# Patient Record
Sex: FEMALE | URBAN - METROPOLITAN AREA
[De-identification: names, ages, dates, MRNs, and addresses within clinical notes are randomized per-mention and may not be internally consistent; named-entity substitution may affect disease eponyms.]

---
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
## 2024-04-08 ENCOUNTER — OFFICE VISIT (OUTPATIENT)
Dept: URGENT CARE | Facility: CLINIC | Age: 19
End: 2024-04-08
Payer: COMMERCIAL

## 2024-04-08 VITALS
DIASTOLIC BLOOD PRESSURE: 82 MMHG | RESPIRATION RATE: 20 BRPM | OXYGEN SATURATION: 98 % | TEMPERATURE: 101.1 F | HEART RATE: 128 BPM | WEIGHT: 115.2 LBS | SYSTOLIC BLOOD PRESSURE: 108 MMHG

## 2024-04-08 DIAGNOSIS — B34.9 ACUTE VIRAL SYNDROME: Primary | ICD-10-CM

## 2024-04-08 DIAGNOSIS — R68.89 FLU-LIKE SYMPTOMS: ICD-10-CM

## 2024-04-08 LAB — S PYO AG THROAT QL: NEGATIVE

## 2024-04-08 PROCEDURE — 87070 CULTURE OTHR SPECIMN AEROBIC: CPT | Performed by: PHYSICIAN ASSISTANT

## 2024-04-08 PROCEDURE — 99203 OFFICE O/P NEW LOW 30 MIN: CPT | Performed by: PHYSICIAN ASSISTANT

## 2024-04-08 PROCEDURE — 87147 CULTURE TYPE IMMUNOLOGIC: CPT | Performed by: PHYSICIAN ASSISTANT

## 2024-04-08 PROCEDURE — 87636 SARSCOV2 & INF A&B AMP PRB: CPT | Performed by: PHYSICIAN ASSISTANT

## 2024-04-08 NOTE — PATIENT INSTRUCTIONS
Upper Respiratory Infection     Over-the-counter medications to help with symptoms   Plain Robitussin or plain Mucinex as directed on the packaging for mucus relief  Sudafed (for those without history of high blood pressure) or Coricidin HBP (for those with history of high blood pressure) for nasal/sinus congestion  Ibuprofen or Acetaminophen for pain, fever, or sore throat   Afrin nasal spray (do not use more than 5 days!)   Saline nasal spray or Flonase nasal spray for nasal congestion  Vitamin C supplements may help shorten duration of colds  Other measures to help with illness   Get plenty of rest   Increase your fluid intake while you feel ill (especially drinks with electrolytes such as Gatorade or Pedialyte)  Follow up with your primary care provider if:  You develop a fever after being fever-free (>100 degrees F)  You have mild chest tightness or mild shortness of breath   Symptoms worsen after initially getting better   Symptoms persist more than 10 days   Go to the emergency room if:   You have moderate to severe chest tightness or shortness of breath   You have any other severe or concerning symptoms

## 2024-04-08 NOTE — PROGRESS NOTES
[facility]        NAME: Madelyn Ferrell is a 18 y.o. female  : 2005    MRN: [id number]  DATE: 2024  TIME: 6:38 PM      Assessment and Plan     Acute viral syndrome [B34.9]  1. Acute viral syndrome  POCT rapid strepA      2. Flu-like symptoms  Covid/Flu-Office Collect    Throat culture    CANCELED: Throat culture        Note:   Rapid strep negative in office   Will send out throat culture and treat if necessary based on results   Sending out covid/flu PCR test - won't change treatment at this time   Patient to continue OTC medications as needed for symptoms     Patient Instructions     Patient Instructions   Upper Respiratory Infection     Over-the-counter medications to help with symptoms   Plain Robitussin or plain Mucinex as directed on the packaging for mucus relief  Sudafed (for those without history of high blood pressure) or Coricidin HBP (for those with history of high blood pressure) for nasal/sinus congestion  Ibuprofen or Acetaminophen for pain, fever, or sore throat   Afrin nasal spray (do not use more than 5 days!)   Saline nasal spray or Flonase nasal spray for nasal congestion  Vitamin C supplements may help shorten duration of colds  Other measures to help with illness   Get plenty of rest   Increase your fluid intake while you feel ill (especially drinks with electrolytes such as Gatorade or Pedialyte)  Follow up with your primary care provider if:  You develop a fever after being fever-free (>100 degrees F)  You have mild chest tightness or mild shortness of breath   Symptoms worsen after initially getting better   Symptoms persist more than 10 days   Go to the emergency room if:   You have moderate to severe chest tightness or shortness of breath   You have any other severe or concerning symptoms        Follow up with PCP in 3-5 days.  Go to ER if symptoms worsen.    Chief Complaint     Chief Complaint   Patient presents with    Sore Throat     Sore throat, fever, body  aches, nausea X 2 days         History of Present Illness     Patient presents with sore throat, fever, body aches, nausea, and cough x 2 days. She has been taking DayQuil, NyQuil, and Advil with mild relief.         Review of Systems     Review of Systems   Constitutional:  Positive for fever. Negative for chills and fatigue.   HENT:  Positive for sore throat. Negative for congestion, ear pain, postnasal drip, rhinorrhea, sinus pressure and sinus pain.    Eyes:  Negative for pain and visual disturbance.   Respiratory:  Positive for cough. Negative for chest tightness and shortness of breath.    Cardiovascular:  Negative for chest pain and palpitations.   Gastrointestinal:  Positive for nausea. Negative for abdominal pain, diarrhea and vomiting.   Genitourinary:  Negative for dysuria and hematuria.   Musculoskeletal:  Positive for myalgias. Negative for arthralgias and back pain.   Skin:  Negative for rash.   Neurological:  Negative for dizziness, seizures, syncope, numbness and headaches.   All other systems reviewed and are negative.        Current Medications     No current outpatient medications on file.    Current Allergies     Allergies as of 04/08/2024    (No Known Allergies)              The following portions of the patient's history were reviewed and updated as appropriate: allergies, current medications, past family history, past medical history, past social history, past surgical history, and problem list.     History reviewed. No pertinent past medical history.    History reviewed. No pertinent surgical history.    History reviewed. No pertinent family history.      Medications have been verified.        Objective     /82 (BP Location: Left arm, Patient Position: Sitting, Cuff Size: Adult)   Pulse (!) 128   Temp (!) 101.1 °F (38.4 °C) (Tympanic)   Resp 20   Wt 52.3 kg (115 lb 3.2 oz)   LMP 04/01/2024   SpO2 98%   Patient's last menstrual period was 04/01/2024.         Physical Exam      Physical Exam  Vitals and nursing note reviewed. Exam conducted with a chaperone present (mother).   Constitutional:       Appearance: Normal appearance. She is normal weight. She is not ill-appearing.   HENT:      Head: Normocephalic and atraumatic.      Right Ear: Tympanic membrane, ear canal and external ear normal.      Left Ear: Tympanic membrane, ear canal and external ear normal.      Nose: Nose normal.      Mouth/Throat:      Mouth: Mucous membranes are moist.      Pharynx: Posterior oropharyngeal erythema present.      Comments: Mild oropharyngeal erythema. Tonsils 1+ bilaterally without exudates   Cardiovascular:      Rate and Rhythm: Normal rate and regular rhythm.      Heart sounds: Normal heart sounds.   Pulmonary:      Effort: Pulmonary effort is normal.      Breath sounds: Normal breath sounds.   Skin:     General: Skin is warm and dry.   Neurological:      General: No focal deficit present.      Mental Status: She is alert and oriented to person, place, and time.   Psychiatric:         Mood and Affect: Mood normal.         Behavior: Behavior normal.

## 2024-04-08 NOTE — LETTER
April 8, 2024     Patient: Madelyn Ferrell   YOB: 2005   Date of Visit: 4/8/2024       To Whom it May Concern:    Madelyn Ferrell was seen in my clinic on 4/8/2024. She may return to work on 4/10/2024 as long as she is fever free for 24+ hours without the use of fever-reducing medications .    If you have any questions or concerns, please don't hesitate to call.         Sincerely,          Leandra Dela Cruz PA-C        CC: No Recipients

## 2024-04-09 LAB
FLUAV RNA RESP QL NAA+PROBE: NEGATIVE
FLUBV RNA RESP QL NAA+PROBE: POSITIVE
SARS-COV-2 RNA RESP QL NAA+PROBE: NEGATIVE

## 2024-04-09 NOTE — RESULT ENCOUNTER NOTE
Your Flu test came back positive. You do have the Flu. Continue care with over the counter medications to help you get through the illness. Once you are fever free for 24 hours and overall feeling better you can return to work/school. I hope you feel better soon!

## 2024-04-11 ENCOUNTER — TELEPHONE (OUTPATIENT)
Dept: URGENT CARE | Facility: CLINIC | Age: 19
End: 2024-04-11

## 2024-04-11 DIAGNOSIS — J02.0 STREP PHARYNGITIS: Primary | ICD-10-CM

## 2024-04-11 LAB — BACTERIA THROAT CULT: ABNORMAL

## 2024-04-11 RX ORDER — AMOXICILLIN 500 MG/1
500 CAPSULE ORAL EVERY 12 HOURS SCHEDULED
Qty: 20 CAPSULE | Refills: 0 | Status: SHIPPED | OUTPATIENT
Start: 2024-04-11 | End: 2024-04-21

## 2024-04-11 NOTE — TELEPHONE ENCOUNTER
Attempted to call patient to inform of test results. No answer and mailbox was not set up, so I was unable to leave a voicemail. Will send patient a message on [employer].

## 2025-06-14 ENCOUNTER — OFFICE VISIT (OUTPATIENT)
Dept: URGENT CARE | Facility: CLINIC | Age: 20
End: 2025-06-14
Payer: COMMERCIAL

## 2025-06-14 VITALS
SYSTOLIC BLOOD PRESSURE: 128 MMHG | HEART RATE: 116 BPM | RESPIRATION RATE: 18 BRPM | DIASTOLIC BLOOD PRESSURE: 84 MMHG | TEMPERATURE: 97 F | OXYGEN SATURATION: 98 % | WEIGHT: 122 LBS

## 2025-06-14 DIAGNOSIS — J02.9 SORE THROAT: ICD-10-CM

## 2025-06-14 DIAGNOSIS — J02.9 EXUDATIVE PHARYNGITIS: Primary | ICD-10-CM

## 2025-06-14 LAB — S PYO AG THROAT QL: NEGATIVE

## 2025-06-14 PROCEDURE — 87070 CULTURE OTHR SPECIMN AEROBIC: CPT | Performed by: PHYSICIAN ASSISTANT

## 2025-06-14 PROCEDURE — 87880 STREP A ASSAY W/OPTIC: CPT | Performed by: PHYSICIAN ASSISTANT

## 2025-06-14 PROCEDURE — 99213 OFFICE O/P EST LOW 20 MIN: CPT | Performed by: PHYSICIAN ASSISTANT

## 2025-06-14 RX ORDER — METHYLPREDNISOLONE 4 MG/1
TABLET ORAL
Qty: 21 TABLET | Refills: 0 | Status: SHIPPED | OUTPATIENT
Start: 2025-06-14

## 2025-06-14 NOTE — PROGRESS NOTES
Patient Name: Madelyn Ferrell      : 2005      MRN: [id number]  Encounter Provider: Ariane Drummond PA-C  Encounter Date: 2025  Encounter department: [facility]    Assessment & Plan  Exudative pharyngitis  Reviewed negative rapid strep test with the patient we will send out for culture.  Will also put in orders for mononucleosis testing.    We will contact patient with any positive results.  Otherwise she can use an oral steroid to help with inflammation and pain.  Orders:    Throat culture; Future    Mononucleosis screen; Future    methylPREDNISolone 4 MG tablet therapy pack; Use as directed on package    Sore throat    Orders:    POCT rapid strepA      POC Testing Results: Rapid strep test negative.    Patient Instructions:   Patient Instructions   Follow up with PCP in 3-5 days.  Proceed to  ER if symptoms worsen.    If tests are performed, our office will contact you with results only if changes need to made to the care plan discussed with you at the visit. You can review your full results on [facility].     Subjective   Chief Complaint   Patient presents with    Sore Throat     Patient here with sore throat for a week now. She states she noticed a bump on the back of her throat with white spots.          Madelyn Ferrell is a 19 y.o.female  Sore Throat   This is a new problem. The current episode started in the past 7 days. The problem has been gradually worsening. The pain is worse on the right side. The pain is at a severity of 4/10. The pain is mild. Associated symptoms include congestion, coughing, headaches, a hoarse voice, neck pain, swollen glands and trouble swallowing. Pertinent negatives include no abdominal pain, diarrhea, drooling, ear discharge, ear pain, plugged ear sensation, shortness of breath, stridor or vomiting. She has had exposure to strep.       Review of Systems   HENT:  Positive for congestion, hoarse voice, sore throat and trouble  swallowing. Negative for drooling, ear discharge and ear pain.    Respiratory:  Positive for cough. Negative for shortness of breath and stridor.    Gastrointestinal:  Negative for abdominal pain, diarrhea and vomiting.   Musculoskeletal:  Positive for neck pain.   Neurological:  Positive for headaches.   All other systems reviewed and are negative.      Current Medications[1]  Allergies as of 06/14/2025    (No Known Allergies)     Past Medical History[2]  Past Surgical History[3]  Family History[4]     Objective   /84   Pulse (!) 116   Temp (!) 97 °F (36.1 °C)   Resp 18   Wt 55.3 kg (122 lb)   SpO2 98%      Physical Exam  Vitals and nursing note reviewed.   Constitutional:       Appearance: Normal appearance.   HENT:      Right Ear: Tympanic membrane, ear canal and external ear normal.      Left Ear: Tympanic membrane, ear canal and external ear normal.      Nose: Nose normal.      Mouth/Throat:      Mouth: Mucous membranes are moist.      Pharynx: Oropharyngeal exudate and posterior oropharyngeal erythema present.     Cardiovascular:      Rate and Rhythm: Normal rate and regular rhythm.   Pulmonary:      Effort: Pulmonary effort is normal.      Breath sounds: Normal breath sounds.     Skin:     General: Skin is warm and dry.     Neurological:      General: No focal deficit present.      Mental Status: She is alert and oriented to person, place, and time.     Psychiatric:         Mood and Affect: Mood normal.         Behavior: Behavior normal.            [1]   Current Outpatient Medications:     methylPREDNISolone 4 MG tablet therapy pack, Use as directed on package, Disp: 21 tablet, Rfl: 0  [2] No past medical history on file.  [3] No past surgical history on file.  [4] No family history on file.

## 2025-06-15 LAB — BACTERIA THROAT CULT: NORMAL

## 2025-06-16 LAB — BACTERIA THROAT CULT: NORMAL
